# Patient Record
Sex: FEMALE | Race: NATIVE HAWAIIAN OR OTHER PACIFIC ISLANDER
[De-identification: names, ages, dates, MRNs, and addresses within clinical notes are randomized per-mention and may not be internally consistent; named-entity substitution may affect disease eponyms.]

---

## 2018-11-09 ENCOUNTER — HOSPITAL ENCOUNTER (OUTPATIENT)
Dept: HOSPITAL 31 - C.CATHLAB | Age: 73
Discharge: HOME | End: 2018-11-09
Attending: INTERNAL MEDICINE
Payer: COMMERCIAL

## 2018-11-09 VITALS — BODY MASS INDEX: 19.9 KG/M2

## 2018-11-09 DIAGNOSIS — E78.5: ICD-10-CM

## 2018-11-09 DIAGNOSIS — I10: ICD-10-CM

## 2018-11-09 DIAGNOSIS — R94.39: Primary | ICD-10-CM

## 2018-11-09 DIAGNOSIS — E11.9: ICD-10-CM

## 2018-11-09 LAB
APTT BLD: 35 SECONDS (ref 21–34)
BUN SERPL-MCNC: 22 MG/DL (ref 7–17)
CALCIUM SERPL-MCNC: 9.5 MG/DL (ref 8.6–10.4)
ERYTHROCYTE [DISTWIDTH] IN BLOOD BY AUTOMATED COUNT: 13.1 % (ref 11.5–14.5)
GFR NON-AFRICAN AMERICAN: 48
HGB BLD-MCNC: 10.7 G/DL (ref 11–16)
INR PPP: 1
MCH RBC QN AUTO: 30.4 PG (ref 27–31)
MCHC RBC AUTO-ENTMCNC: 34.2 G/DL (ref 33–37)
MCV RBC AUTO: 88.9 FL (ref 81–99)
PLATELET # BLD: 296 K/UL (ref 130–400)
PMV BLD AUTO: 6.7 FL (ref 7.2–11.7)
PROTHROMBIN TIME: 11.4 SECONDS (ref 9.7–12.2)
RBC # BLD AUTO: 3.53 MIL/UL (ref 3.8–5.2)
WBC # BLD AUTO: 3.8 K/UL (ref 4.8–10.8)

## 2018-11-09 PROCEDURE — 99153 MOD SED SAME PHYS/QHP EA: CPT

## 2018-11-09 PROCEDURE — 85610 PROTHROMBIN TIME: CPT

## 2018-11-09 PROCEDURE — 36415 COLL VENOUS BLD VENIPUNCTURE: CPT

## 2018-11-09 PROCEDURE — 93458 L HRT ARTERY/VENTRICLE ANGIO: CPT

## 2018-11-09 PROCEDURE — 85027 COMPLETE CBC AUTOMATED: CPT

## 2018-11-09 PROCEDURE — 85730 THROMBOPLASTIN TIME PARTIAL: CPT

## 2018-11-09 PROCEDURE — 80048 BASIC METABOLIC PNL TOTAL CA: CPT

## 2018-11-09 PROCEDURE — 99152 MOD SED SAME PHYS/QHP 5/>YRS: CPT

## 2018-11-09 PROCEDURE — 82948 REAGENT STRIP/BLOOD GLUCOSE: CPT

## 2018-11-11 NOTE — CARDCATH
PROCEDURE DATE:  11/09/2018



PROCEDURES:

1.  Left heart catheterization.

2.  Coronary angiogram.



CLINICAL INDICATIONS:

1.  Chest pain.

2.  Abnormal stress test.

3.  Hypertension.

4.  Hyperlipidemia.

5.  Diabetes.



PERFORMING PHYSICIAN:  Esdras Gamez MD



PROCEDURE FINDINGS:  After informed consent, the patient was prepped and

draped in the usual sterile fashion.  A 2% lidocaine was given in the right

groin for local anesthesia.  Using micropuncture technique, a 6-[place]

sheath was introduced into the right common femoral artery.



A 6-[place] JR4 diagnostic catheter crossed into the left ventricle across

the aortic valve.  Contrast injected and left ventricle angiogram was done.

EDP was measured.  Then, the catheter was pulled back across the aortic

valve.  The gradient across the aortic valve was measured.  Then, the same

catheter engaged into the right coronary artery.  Contrast injected and

right coronary angiogram was done.  Then, the catheter was exchanged to a

6-[place] JL4 diagnostic catheter.  The catheter engaged into the left main

coronary artery.  Contrast injected and left coronary angiogram was done.



The patient tolerated the procedure well.  Postprocedure Mynx closure

device applied in the right groin with excellent hemostasis.



Radiological supervision and radiological interpretation of the coronary

imaging was done.



FINDINGS:

1.  Left main coronary artery is patent.

2.  Mid LAD has 50% concentric narrowing.  Proximal and distal LAD patent. 

Diagonal branches are patent.

3.  Left circumflex has 50% narrowing in the proximal portion.  Distal left

circumflex and obtuse marginal branches are patent.

4.  Right coronary artery is dominant and patent.  Distal right coronary

artery has some luminal irregularities.

5.  LV ejection fraction is approximately 80%.  No gradient across the

aortic valve.  EDP is 18.



IMPRESSION:

1.  Nonobstructive coronaries as described above.

2.  Normal left ventricular systolic function.



PLAN:  Recommend medical management.





__________________________________________

Esdras Gamez MD





DD:  11/11/2018 7:29:55

DT:  11/11/2018 10:20:24

Job # [id number]